# Patient Record
(demographics unavailable — no encounter records)

---

## 2025-05-28 NOTE — HISTORY OF PRESENT ILLNESS
[de-identified] : 35 y/o M with h/o seasonal allergies and recurrent sinusitis p/w cough and itchy/scratchy throat for the past x1 mo. States cough worsens with physical exertion. Saw PCP x2 weeks ago, routine blood was normal according to patient. Was told that cough was likely viral. Also noted that after he played tennis this past weekend, noted development of clogged sensation to bilateral ears. Reports qtip use. Denies hearing loss. Has dog at home - does not shed. Reports snoring. 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 OTC: flonase with some relief. States that he smokes marijuana and occasional tobacco use. States that he has sinusititis x1-2 episodes a year.

## 2025-05-28 NOTE — PROCEDURE
[Topical Lidocaine] : topical lidocaine [Oxymetazoline HCl] : oxymetazoline HCl [Flexible Endoscope] : examined with the flexible endoscope [Congested] : congested [Nasal Mucosa Crusts / Sores] : no lesions [Nasal Mucosa] : no polyps [Deviated to the Lt] : deviated to the left [Nasal Septum] : no lesions [Nasal Septum Mucosa Bleeding] : no bleeding [] : no polyps present [Normal] : the nasopharynx was normal [FreeTextEntry6] : done to eval for sinusitis, has h/o recurrent sinusitis findings: white thick nasal drainage from r max os, b inferior turbinate hypertrophy,

## 2025-05-28 NOTE — PHYSICAL EXAM
[Midline] : trachea located in midline position [Normal] : assessment of respiratory effort is normal [] : septum deviated to the left [de-identified] : right tmj [de-identified] : increased sensitivity of r eac, r eac narrowing, but no erythema/discharge noted [de-identified] : inferior turbinate hypertrophy bilateral [de-identified] : clear secretions  [de-identified] : elongated uvula, low lining palate arch

## 2025-06-11 NOTE — HISTORY OF PRESENT ILLNESS
[de-identified] : 2 weeks follow up visit for this 37 y/o M here to discuss CT sinus for eval of chronic sinusitis and cough.  Was tx'd with medrol dose chanell and azithromycin with relief of cough. He was able to attend soul cycle yesterday for the first time in a while without coughing.

## 2025-06-11 NOTE — ASSESSMENT
[FreeTextEntry1] : 1. chronic sinusitis -plan for FESS and septoplasty -discussed risks/benefits/alternatives of FESS (l maxillary, ethmoid, sphenoid), open SMR, b turbinate reduction -he wished to proceed